# Patient Record
Sex: MALE | Race: WHITE | NOT HISPANIC OR LATINO | Employment: FULL TIME | URBAN - METROPOLITAN AREA
[De-identification: names, ages, dates, MRNs, and addresses within clinical notes are randomized per-mention and may not be internally consistent; named-entity substitution may affect disease eponyms.]

---

## 2024-10-29 ENCOUNTER — APPOINTMENT (EMERGENCY)
Dept: RADIOLOGY | Facility: HOSPITAL | Age: 59
End: 2024-10-29
Payer: OTHER MISCELLANEOUS

## 2024-10-29 ENCOUNTER — HOSPITAL ENCOUNTER (EMERGENCY)
Facility: HOSPITAL | Age: 59
Discharge: HOME | End: 2024-10-29
Attending: EMERGENCY MEDICINE
Payer: OTHER MISCELLANEOUS

## 2024-10-29 VITALS
DIASTOLIC BLOOD PRESSURE: 86 MMHG | HEART RATE: 62 BPM | OXYGEN SATURATION: 99 % | SYSTOLIC BLOOD PRESSURE: 137 MMHG | HEIGHT: 78 IN | WEIGHT: 290 LBS | BODY MASS INDEX: 33.55 KG/M2 | TEMPERATURE: 98.2 F | RESPIRATION RATE: 18 BRPM

## 2024-10-29 DIAGNOSIS — B34.9 VIRAL ILLNESS: ICD-10-CM

## 2024-10-29 DIAGNOSIS — Z77.29 CARBON MONOXIDE EXPOSURE: ICD-10-CM

## 2024-10-29 DIAGNOSIS — R51.9 HEADACHE: Primary | ICD-10-CM

## 2024-10-29 LAB
ALBUMIN SERPL BCG-MCNC: 4.2 G/DL (ref 3.5–5)
ALP SERPL-CCNC: 74 U/L (ref 34–104)
ALT SERPL W P-5'-P-CCNC: 24 U/L (ref 7–52)
ANION GAP SERPL CALCULATED.3IONS-SCNC: 5 MMOL/L (ref 4–13)
AST SERPL W P-5'-P-CCNC: 17 U/L (ref 13–39)
BASOPHILS # BLD AUTO: 0.01 THOUSANDS/ΜL (ref 0–0.1)
BASOPHILS NFR BLD AUTO: 0 % (ref 0–1)
BILIRUB SERPL-MCNC: 1.04 MG/DL (ref 0.2–1)
BNP SERPL-MCNC: 35 PG/ML (ref 0–100)
BUN SERPL-MCNC: 16 MG/DL (ref 5–25)
CALCIUM SERPL-MCNC: 9.3 MG/DL (ref 8.4–10.2)
CARDIAC TROPONIN I PNL SERPL HS: 4 NG/L
CHLORIDE SERPL-SCNC: 106 MMOL/L (ref 96–108)
CO2 SERPL-SCNC: 30 MMOL/L (ref 21–32)
CREAT SERPL-MCNC: 0.96 MG/DL (ref 0.6–1.3)
EOSINOPHIL # BLD AUTO: 0.08 THOUSAND/ΜL (ref 0–0.61)
EOSINOPHIL NFR BLD AUTO: 2 % (ref 0–6)
ERYTHROCYTE [DISTWIDTH] IN BLOOD BY AUTOMATED COUNT: 12.8 % (ref 11.6–15.1)
GAS + CO PNL BLDA: 1.5 % (ref 0–1.5)
GFR SERPL CREATININE-BSD FRML MDRD: 87 ML/MIN/1.73SQ M
GLUCOSE SERPL-MCNC: 106 MG/DL (ref 65–140)
HCT VFR BLD AUTO: 43.4 % (ref 36.5–49.3)
HGB BLD-MCNC: 14.8 G/DL (ref 12–17)
IMM GRANULOCYTES # BLD AUTO: 0.01 THOUSAND/UL (ref 0–0.2)
IMM GRANULOCYTES NFR BLD AUTO: 0 % (ref 0–2)
LYMPHOCYTES # BLD AUTO: 1.13 THOUSANDS/ΜL (ref 0.6–4.47)
LYMPHOCYTES NFR BLD AUTO: 21 % (ref 14–44)
MCH RBC QN AUTO: 30.3 PG (ref 26.8–34.3)
MCHC RBC AUTO-ENTMCNC: 34.1 G/DL (ref 31.4–37.4)
MCV RBC AUTO: 89 FL (ref 82–98)
MONOCYTES # BLD AUTO: 0.52 THOUSAND/ΜL (ref 0.17–1.22)
MONOCYTES NFR BLD AUTO: 10 % (ref 4–12)
NEUTROPHILS # BLD AUTO: 3.55 THOUSANDS/ΜL (ref 1.85–7.62)
NEUTS SEG NFR BLD AUTO: 67 % (ref 43–75)
NRBC BLD AUTO-RTO: 0 /100 WBCS
PLATELET # BLD AUTO: 155 THOUSANDS/UL (ref 149–390)
PMV BLD AUTO: 8.8 FL (ref 8.9–12.7)
POTASSIUM SERPL-SCNC: 4.4 MMOL/L (ref 3.5–5.3)
PROT SERPL-MCNC: 6.9 G/DL (ref 6.4–8.4)
RBC # BLD AUTO: 4.89 MILLION/UL (ref 3.88–5.62)
SODIUM SERPL-SCNC: 141 MMOL/L (ref 135–147)
WBC # BLD AUTO: 5.3 THOUSAND/UL (ref 4.31–10.16)

## 2024-10-29 PROCEDURE — 84484 ASSAY OF TROPONIN QUANT: CPT | Performed by: EMERGENCY MEDICINE

## 2024-10-29 PROCEDURE — 36415 COLL VENOUS BLD VENIPUNCTURE: CPT | Performed by: EMERGENCY MEDICINE

## 2024-10-29 PROCEDURE — 96374 THER/PROPH/DIAG INJ IV PUSH: CPT

## 2024-10-29 PROCEDURE — 80053 COMPREHEN METABOLIC PANEL: CPT | Performed by: EMERGENCY MEDICINE

## 2024-10-29 PROCEDURE — 96375 TX/PRO/DX INJ NEW DRUG ADDON: CPT

## 2024-10-29 PROCEDURE — 93005 ELECTROCARDIOGRAM TRACING: CPT

## 2024-10-29 PROCEDURE — 85025 COMPLETE CBC W/AUTO DIFF WBC: CPT | Performed by: EMERGENCY MEDICINE

## 2024-10-29 PROCEDURE — 96361 HYDRATE IV INFUSION ADD-ON: CPT

## 2024-10-29 PROCEDURE — 99285 EMERGENCY DEPT VISIT HI MDM: CPT | Performed by: EMERGENCY MEDICINE

## 2024-10-29 PROCEDURE — 83880 ASSAY OF NATRIURETIC PEPTIDE: CPT | Performed by: EMERGENCY MEDICINE

## 2024-10-29 PROCEDURE — 82375 ASSAY CARBOXYHB QUANT: CPT | Performed by: EMERGENCY MEDICINE

## 2024-10-29 PROCEDURE — 71045 X-RAY EXAM CHEST 1 VIEW: CPT

## 2024-10-29 PROCEDURE — 99285 EMERGENCY DEPT VISIT HI MDM: CPT

## 2024-10-29 RX ORDER — KETOROLAC TROMETHAMINE 30 MG/ML
30 INJECTION, SOLUTION INTRAMUSCULAR; INTRAVENOUS ONCE
Status: COMPLETED | OUTPATIENT
Start: 2024-10-29 | End: 2024-10-29

## 2024-10-29 RX ORDER — DIPHENHYDRAMINE HYDROCHLORIDE 50 MG/ML
50 INJECTION INTRAMUSCULAR; INTRAVENOUS ONCE
Status: COMPLETED | OUTPATIENT
Start: 2024-10-29 | End: 2024-10-29

## 2024-10-29 RX ADMIN — DIPHENHYDRAMINE HYDROCHLORIDE 50 MG: 50 INJECTION, SOLUTION INTRAMUSCULAR; INTRAVENOUS at 11:02

## 2024-10-29 RX ADMIN — SODIUM CHLORIDE 1000 ML: 0.9 INJECTION, SOLUTION INTRAVENOUS at 10:55

## 2024-10-29 RX ADMIN — KETOROLAC TROMETHAMINE 30 MG: 30 INJECTION, SOLUTION INTRAMUSCULAR at 11:02

## 2024-10-29 NOTE — ED NOTES
Pt requests to be put on 2 L/min NC due to being very sleepy. Pt normally sleeps with Cpap. Pt put on 2 L/min NC and is resting at this time. Pt attached to monitor.      Heidi Oconnor RN  10/29/24 [id number]

## 2024-10-29 NOTE — Clinical Note
Adria Pedro was seen and treated in our emergency department on 10/29/2024.                Diagnosis:     Adria  may return to work on return date.    He may return on this date: 10/30/2024         If you have any questions or concerns, please don't hesitate to call.      Monica Luo MD    ______________________________           _______________          _______________  Hospital Representative                              Date                                Time

## 2024-10-29 NOTE — DISCHARGE INSTRUCTIONS
Your carbon monoxide level is normal.  Your chest xray and EKG are normal.  You may just be coming down with a viral illness.  Rest as needed.

## 2024-10-29 NOTE — ED PROVIDER NOTES
Time reflects when diagnosis was documented in both MDM as applicable and the Disposition within this note       Time User Action Codes Description Comment    10/29/2024 11:27 AM Monica Luo Add [R51.9] Headache     10/29/2024 11:27 AM Monica Luo Add [Z77.29] Carbon monoxide exposure     10/29/2024 12:10 PM Monica Luo Add [B34.9] Viral illness           ED Disposition       ED Disposition   Discharge    Condition   Stable    Date/Time   Tue Oct 29, 2024 12:10 PM    Comment   Adria Pedro discharge to home/self care.                   Assessment & Plan       Medical Decision Making  Differential includes but not limited to CO toxicity (though unlikely since >48 hours since exposure), viral illness, migraine syndrome, atypical ACS.  1130 - CO normal, WBC normal.  EKG and trop normal.  CXR normal.  1210 -CMP still pending - lab having equipment problems.  Pt. Feeling better.  Advised rest, fluids, time.  Given work note.    Amount and/or Complexity of Data Reviewed  Labs: ordered.  Radiology: ordered and independent interpretation performed.    Risk  Prescription drug management.             Medications   sodium chloride 0.9 % bolus 1,000 mL (1,000 mL Intravenous New Bag 10/29/24 1055)   ketorolac (TORADOL) injection 30 mg (30 mg Intravenous Given 10/29/24 1102)   diphenhydrAMINE (BENADRYL) injection 50 mg (50 mg Intravenous Given 10/29/24 1102)       ED Risk Strat Scores                                              History of Present Illness       Chief Complaint   Patient presents with    Shortness of Breath    Headache     Pt was inside a truck at work. Pt sts it was pumping carbon monoxide through the heating system inside the truck. Pt reports of sob with exertion,also has a headache since Sat       No past medical history on file.   No past surgical history on file.   No family history on file.       No existing history information found.   No existing history information found.   I have reviewed  and agree with the history as documented.     60 yo male was sleeping in his truck 3 days ago and there was a carbon monoxide leak.  Since then he has had a headache.  No relief with tylenol or advil.  No fever.  No cough.  + SOB with exertion but that is not totally new.  No chest pain.  Pt. Is not a smoker.  He did take 2 home covid tests which were negative. The last time he was in that truck was over 48 hours ago.      History provided by:  Patient   used: No    Shortness of Breath  Associated symptoms: headaches    Associated symptoms: no abdominal pain, no chest pain, no cough, no fever, no rash, no sore throat and no vomiting    Headache  Associated symptoms: no abdominal pain, no back pain, no congestion, no cough, no diarrhea, no dizziness, no fever, no myalgias, no nausea, no sore throat and no vomiting        Review of Systems   Constitutional: Negative.  Negative for chills and fever.   HENT: Negative.  Negative for congestion and sore throat.    Eyes: Negative.    Respiratory:  Positive for shortness of breath. Negative for cough.    Cardiovascular: Negative.  Negative for chest pain and leg swelling.   Gastrointestinal: Negative.  Negative for abdominal pain, diarrhea, nausea and vomiting.   Genitourinary: Negative.  Negative for dysuria, flank pain and hematuria.   Musculoskeletal: Negative.  Negative for back pain and myalgias.   Skin: Negative.  Negative for rash and wound.   Neurological:  Positive for headaches. Negative for dizziness.   Psychiatric/Behavioral: Negative.  Negative for confusion and hallucinations. The patient is not nervous/anxious.    All other systems reviewed and are negative.          Objective       ED Triage Vitals   Temperature Pulse Blood Pressure Respirations SpO2 Patient Position - Orthostatic VS   10/29/24 1018 10/29/24 1018 10/29/24 1018 10/29/24 1018 10/29/24 1018 10/29/24 1018   98.2 °F (36.8 °C) 73 138/88 18 98 % Sitting      Temp Source Heart  Rate Source BP Location FiO2 (%) Pain Score    10/29/24 1018 10/29/24 1018 10/29/24 1018 -- 10/29/24 1102    Tympanic Monitor Right arm  2      Vitals      Date and Time Temp Pulse SpO2 Resp BP Pain Score FACES Pain Rating User   10/29/24 1200 -- 62 99 % 18 137/86 -- -- KR   10/29/24 1102 -- -- -- -- -- 2 -- NM   10/29/24 1018 98.2 °F (36.8 °C) 73 98 % 18 138/88 -- -- ANNA            Physical Exam  Vitals and nursing note reviewed.   Constitutional:       General: He is not in acute distress.     Appearance: He is well-developed. He is not ill-appearing or diaphoretic.   HENT:      Head: Normocephalic and atraumatic.   Eyes:      General: No scleral icterus.     Conjunctiva/sclera: Conjunctivae normal.   Cardiovascular:      Rate and Rhythm: Normal rate and regular rhythm.      Heart sounds: Normal heart sounds. No murmur heard.  Pulmonary:      Effort: Pulmonary effort is normal. No respiratory distress.      Breath sounds: Normal breath sounds.   Abdominal:      General: There is no distension.      Palpations: Abdomen is soft.   Musculoskeletal:         General: No deformity. Normal range of motion.      Cervical back: Normal range of motion and neck supple.   Skin:     General: Skin is warm and dry.      Coloration: Skin is not pale.      Findings: No erythema or rash.   Neurological:      General: No focal deficit present.      Mental Status: He is alert and oriented to person, place, and time.      Cranial Nerves: No cranial nerve deficit.   Psychiatric:         Mood and Affect: Mood normal.         Behavior: Behavior normal.         Results Reviewed       Procedure Component Value Units Date/Time    B-Type Natriuretic Peptide(BNP) [[id number]]  (Normal) Collected: 10/29/24 1054    Lab Status: Final result Specimen: Blood from Arm, Left Updated: 10/29/24 1139     BNP 35 pg/mL     Carboxyhemoglobin [[id number]]  (Normal) Collected: 10/29/24 1110    Lab Status: Final result Specimen: Blood from Arm, Left Updated:  10/29/24 1126     Carbon Monoxide, Blood 1.5 %     Narrative:      Therapeutic levels (1 mg/mL and 2 mg/mL) of hydroxocobalamin may interfere with the fCOHb and fMetHb where it may cause lower than expected values  Normal Carboxyhemoglobin range for nonsmokers is <1.5%   Normal Carboxyhemoglobin range for smokers is 1.5% to 5.1%     HS Troponin 0hr (reflex protocol) [[id number]]  (Normal) Collected: 10/29/24 1054    Lab Status: Final result Specimen: Blood from Arm, Left Updated: 10/29/24 1124     hs TnI 0hr 4 ng/L     CBC and differential [[id number]]  (Abnormal) Collected: 10/29/24 1054    Lab Status: Final result Specimen: Blood from Arm, Left Updated: 10/29/24 1103     WBC 5.30 Thousand/uL      RBC 4.89 Million/uL      Hemoglobin 14.8 g/dL      Hematocrit 43.4 %      MCV 89 fL      MCH 30.3 pg      MCHC 34.1 g/dL      RDW 12.8 %      MPV 8.8 fL      Platelets 155 Thousands/uL      nRBC 0 /100 WBCs      Segmented % 67 %      Immature Grans % 0 %      Lymphocytes % 21 %      Monocytes % 10 %      Eosinophils Relative 2 %      Basophils Relative 0 %      Absolute Neutrophils 3.55 Thousands/µL      Absolute Immature Grans 0.01 Thousand/uL      Absolute Lymphocytes 1.13 Thousands/µL      Absolute Monocytes 0.52 Thousand/µL      Eosinophils Absolute 0.08 Thousand/µL      Basophils Absolute 0.01 Thousands/µL     Comprehensive metabolic panel [[id number]] Collected: 10/29/24 1054    Lab Status: In process Specimen: Blood from Arm, Left Updated: 10/29/24 1100            XR chest 1 view   ED Interpretation by Monica Luo MD (10/29 1133)   NAD      Final Interpretation by Jesus Rodriguez MD (10/29 1153)      No acute cardiopulmonary disease.            Workstation performed: [id number]             ECG 12 Lead Documentation Only    Date/Time: 10/29/2024 10:43 AM    Performed by: Monica Luo MD  Authorized by: Monica Luo MD    Indications / Diagnosis:  Sob, CO exposure  ECG reviewed by me, the ED Provider: yes     Patient location:  ED  Previous ECG:     Previous ECG:  Unavailable  Interpretation:     Interpretation: normal    Rate:     ECG rate:  65    ECG rate assessment: normal    Rhythm:     Rhythm: sinus rhythm    Ectopy:     Ectopy: none    QRS:     QRS axis:  Normal  Conduction:     Conduction: normal    ST segments:     ST segments:  Normal  T waves:     T waves: normal        ED Medication and Procedure Management   None     Patient's Medications    No medications on file     No discharge procedures on file.  ED SEPSIS DOCUMENTATION   Time reflects when diagnosis was documented in both MDM as applicable and the Disposition within this note       Time User Action Codes Description Comment    10/29/2024 11:27 AM Monica Luo [R51.9] Headache     10/29/2024 11:27 AM Monica Luo [Z77.29] Carbon monoxide exposure     10/29/2024 12:10 PM Monica Luo [B34.9] Viral illness                  Monica Luo MD  10/29/24 1214

## 2024-10-30 LAB
ATRIAL RATE: 65 BPM
P AXIS: 41 DEGREES
PR INTERVAL: 156 MS
QRS AXIS: 48 DEGREES
QRSD INTERVAL: 90 MS
QT INTERVAL: 378 MS
QTC INTERVAL: 393 MS
T WAVE AXIS: 29 DEGREES
VENTRICULAR RATE: 65 BPM

## 2024-10-30 PROCEDURE — 93010 ELECTROCARDIOGRAM REPORT: CPT | Performed by: INTERNAL MEDICINE
